# Patient Record
Sex: FEMALE | Race: BLACK OR AFRICAN AMERICAN | NOT HISPANIC OR LATINO
[De-identification: names, ages, dates, MRNs, and addresses within clinical notes are randomized per-mention and may not be internally consistent; named-entity substitution may affect disease eponyms.]

---
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
## 2020-01-15 ENCOUNTER — APPOINTMENT (OUTPATIENT)
Dept: PEDIATRIC ORTHOPEDIC SURGERY | Facility: CLINIC | Age: 20
End: 2020-01-15
Payer: MEDICAID

## 2020-01-15 DIAGNOSIS — G80.9 CEREBRAL PALSY, UNSPECIFIED: ICD-10-CM

## 2020-01-15 PROCEDURE — 99212 OFFICE O/P EST SF 10 MIN: CPT

## 2020-02-12 ENCOUNTER — OUTPATIENT (OUTPATIENT)
Dept: OUTPATIENT SERVICES | Age: 20
LOS: 1 days | End: 2020-02-12

## 2020-02-12 VITALS
SYSTOLIC BLOOD PRESSURE: 109 MMHG | HEART RATE: 113 BPM | RESPIRATION RATE: 16 BRPM | TEMPERATURE: 97 F | DIASTOLIC BLOOD PRESSURE: 65 MMHG | WEIGHT: 72.09 LBS | OXYGEN SATURATION: 100 % | HEIGHT: 58.07 IN

## 2020-02-12 DIAGNOSIS — K02.9 DENTAL CARIES, UNSPECIFIED: ICD-10-CM

## 2020-02-12 DIAGNOSIS — F91.9 CONDUCT DISORDER, UNSPECIFIED: ICD-10-CM

## 2020-02-12 DIAGNOSIS — Z98.890 OTHER SPECIFIED POSTPROCEDURAL STATES: Chronic | ICD-10-CM

## 2020-02-12 DIAGNOSIS — G40.909 EPILEPSY, UNSPECIFIED, NOT INTRACTABLE, WITHOUT STATUS EPILEPTICUS: ICD-10-CM

## 2020-02-12 RX ORDER — DIAZEPAM 5 MG
10 TABLET ORAL
Qty: 0 | Refills: 0 | DISCHARGE

## 2020-02-12 RX ORDER — POLYETHYLENE GLYCOL 3350 17 G/17G
17 POWDER, FOR SOLUTION ORAL
Qty: 0 | Refills: 0 | DISCHARGE

## 2020-02-12 RX ORDER — DOCUSATE SODIUM 100 MG
1 CAPSULE ORAL
Qty: 0 | Refills: 0 | DISCHARGE

## 2020-02-12 RX ORDER — OXCARBAZEPINE 300 MG/1
1 TABLET, FILM COATED ORAL
Qty: 0 | Refills: 0 | DISCHARGE

## 2020-02-12 RX ORDER — OXCARBAZEPINE 300 MG/1
2 TABLET, FILM COATED ORAL
Qty: 0 | Refills: 0 | DISCHARGE

## 2020-02-12 RX ORDER — BACLOFEN 100 %
1 POWDER (GRAM) MISCELLANEOUS
Qty: 0 | Refills: 0 | DISCHARGE

## 2020-02-12 NOTE — H&P PST ADULT - NSICDXPROBLEM_GEN_ALL_CORE_FT
PROBLEM DIAGNOSES  Problem: Dental caries  Assessment and Plan: scheduled for restorations and extractions on 2/25/2020  Notify PCP and Surgeon if s/s infection develop prior to procedure  Given urine cup and instructions of ways to obtain urine from diaper to bring on DOS.       Problem: Epilepsy  Assessment and Plan: Seizure precautions

## 2020-02-12 NOTE — H&P PST ADULT - NEUROLOGICAL COMMENTS
History of seizure disorder- since 2months of age. On Trileptal. last seizure 1 year ago- generalized shaking

## 2020-02-12 NOTE — H&P PST ADULT - NSICDXPASTMEDICALHX_GEN_ALL_CORE_FT
PAST MEDICAL HISTORY:  Developmental delay     Epilepsy     H/O cerebral palsy PAST MEDICAL HISTORY:  Developmental delay     Epilepsy     H/O cerebral palsy     Sickle cell trait

## 2020-02-12 NOTE — H&P PST ADULT - HISTORY OF PRESENT ILLNESS
20yo here for PST.  She has a complex medical history of cerebral palsy, kyphosis and leg length discrepancy, developmental delay, and seizure disorder. She has a history of dental caries and bruxism and is here prior to dental restorations and extractions.  She had had prior surgery 20yo here for PST.  She has a complex medical history of cerebral palsy, kyphosis and leg length discrepancy, developmental delay, and seizure disorder. She has a history of dental caries and bruxism and is here prior to dental restorations and extractions.  She had had prior surgery with no reported complications related to surgery or anesthesia.

## 2020-02-12 NOTE — H&P PST ADULT - REASON FOR ADMISSION
Here today for presurgical assessment prior to dental restorations and extractions scheduled on 2/25/2020 with Dr. Yang.

## 2020-02-12 NOTE — H&P PST ADULT - NS PRO REFERRAL CMGT
Pt. seen by Child Life Specialist. Pt. appeared to be coping appropriately. Pt. has developmental vulnerability. Parental support and preparation was provided.

## 2020-02-12 NOTE — H&P PST ADULT - RS GEN PE MLT RESP DETAILS PC
normal/airway patent/clear to auscultation bilaterally/good air movement/breath sounds equal/respirations non-labored

## 2020-02-12 NOTE — H&P PST ADULT - NSICDXPASTSURGICALHX_GEN_ALL_CORE_FT
PAST SURGICAL HISTORY:  No significant past surgical history PAST SURGICAL HISTORY:  H/O surgical procedure Gastrocnemius recession

## 2020-02-12 NOTE — H&P PST ADULT - NSICDXFAMHXPERTINENTNEGATIVE_GEN_A_CORE_FT
Mother- bipolar, sickle cell trait, Cancer cells , no psh   father- unknown   No siblings   MGM- bulging disc, hypothyroidism , +psh  MGF- heart disease, +psh triple   PGM- unknown   PGF- unknown

## 2020-02-12 NOTE — H&P PST ADULT - NEGATIVE MUSCULOSKELETAL SYMPTOMS
sees orthopedics for spasticity. Gets PT/OT at home sees orthopedics for spasticity. Gets PT/OT. Contractures.  History of  gastrocnemius recession

## 2020-02-24 ENCOUNTER — TRANSCRIPTION ENCOUNTER (OUTPATIENT)
Age: 20
End: 2020-02-24

## 2020-02-25 ENCOUNTER — OUTPATIENT (OUTPATIENT)
Dept: OUTPATIENT SERVICES | Age: 20
LOS: 1 days | Discharge: HOME | End: 2020-02-25

## 2020-02-25 VITALS
DIASTOLIC BLOOD PRESSURE: 74 MMHG | RESPIRATION RATE: 18 BRPM | HEIGHT: 58.07 IN | WEIGHT: 72.09 LBS | SYSTOLIC BLOOD PRESSURE: 120 MMHG | OXYGEN SATURATION: 100 % | TEMPERATURE: 97 F

## 2020-02-25 VITALS
SYSTOLIC BLOOD PRESSURE: 126 MMHG | HEART RATE: 84 BPM | DIASTOLIC BLOOD PRESSURE: 73 MMHG | OXYGEN SATURATION: 100 % | TEMPERATURE: 98 F | RESPIRATION RATE: 30 BRPM

## 2020-02-25 DIAGNOSIS — Z98.890 OTHER SPECIFIED POSTPROCEDURAL STATES: Chronic | ICD-10-CM

## 2020-02-25 DIAGNOSIS — F91.9 CONDUCT DISORDER, UNSPECIFIED: ICD-10-CM

## 2020-02-25 RX ORDER — MIDAZOLAM HYDROCHLORIDE 1 MG/ML
16 INJECTION, SOLUTION INTRAMUSCULAR; INTRAVENOUS ONCE
Refills: 0 | Status: DISCONTINUED | OUTPATIENT
Start: 2020-02-25 | End: 2020-02-25

## 2020-02-25 RX ORDER — FENTANYL CITRATE 50 UG/ML
15 INJECTION INTRAVENOUS
Refills: 0 | Status: DISCONTINUED | OUTPATIENT
Start: 2020-02-25 | End: 2020-02-25

## 2020-02-25 RX ORDER — IBUPROFEN 200 MG
300 TABLET ORAL EVERY 6 HOURS
Refills: 0 | Status: DISCONTINUED | OUTPATIENT
Start: 2020-02-25 | End: 2020-03-11

## 2020-02-25 RX ORDER — SODIUM CHLORIDE 9 MG/ML
1000 INJECTION, SOLUTION INTRAVENOUS
Refills: 0 | Status: DISCONTINUED | OUTPATIENT
Start: 2020-02-25 | End: 2020-03-11

## 2020-02-25 RX ORDER — ONDANSETRON 8 MG/1
4 TABLET, FILM COATED ORAL ONCE
Refills: 0 | Status: DISCONTINUED | OUTPATIENT
Start: 2020-02-25 | End: 2020-02-26

## 2020-02-25 RX ORDER — IBUPROFEN 200 MG
15 TABLET ORAL
Qty: 0 | Refills: 0 | DISCHARGE
Start: 2020-02-25

## 2020-02-25 RX ADMIN — MIDAZOLAM HYDROCHLORIDE 16 MILLIGRAM(S): 1 INJECTION, SOLUTION INTRAMUSCULAR; INTRAVENOUS at 13:30

## 2020-03-03 PROBLEM — Z86.69 PERSONAL HISTORY OF OTHER DISEASES OF THE NERVOUS SYSTEM AND SENSE ORGANS: Chronic | Status: ACTIVE | Noted: 2020-02-12

## 2020-03-03 PROBLEM — D57.3 SICKLE-CELL TRAIT: Chronic | Status: ACTIVE | Noted: 2020-02-12

## 2020-03-03 PROBLEM — R62.50 UNSPECIFIED LACK OF EXPECTED NORMAL PHYSIOLOGICAL DEVELOPMENT IN CHILDHOOD: Chronic | Status: ACTIVE | Noted: 2020-02-12

## 2020-03-03 PROBLEM — K02.9 DENTAL CARIES, UNSPECIFIED: Chronic | Status: ACTIVE | Noted: 2020-02-12

## 2020-04-15 ENCOUNTER — APPOINTMENT (OUTPATIENT)
Dept: PEDIATRIC ORTHOPEDIC SURGERY | Facility: CLINIC | Age: 20
End: 2020-04-15

## 2021-07-06 NOTE — HISTORY OF PRESENT ILLNESS
[0] : currently ~his/her~ pain is 0 out of 10 [Stable] : stable [FreeTextEntry1] : 17 y/o female pt with CP presenting to the clinic today for scoliosis evaluation. Previously seen for bilateral knee flexion contractures. Pt is nonverbal and presents today in a wheelchair. Mother reports that the pt used to be able to walk independently and now is wheelchair bound. Pt receives PT and OT at home. When pt is laying down or seated, her knees are always in flexion. Mother notes it is hard to get the pt into full extension. AFO braces are to be obtained this week.  [facility]